# Patient Record
Sex: FEMALE | Race: BLACK OR AFRICAN AMERICAN | NOT HISPANIC OR LATINO
[De-identification: names, ages, dates, MRNs, and addresses within clinical notes are randomized per-mention and may not be internally consistent; named-entity substitution may affect disease eponyms.]

---

## 2019-03-29 PROBLEM — Z00.00 ENCOUNTER FOR PREVENTIVE HEALTH EXAMINATION: Status: ACTIVE | Noted: 2019-03-29

## 2019-05-13 ENCOUNTER — APPOINTMENT (OUTPATIENT)
Dept: ENDOCRINOLOGY | Facility: CLINIC | Age: 34
End: 2019-05-13
Payer: COMMERCIAL

## 2019-05-13 VITALS
HEIGHT: 64 IN | SYSTOLIC BLOOD PRESSURE: 100 MMHG | WEIGHT: 191 LBS | DIASTOLIC BLOOD PRESSURE: 67 MMHG | HEART RATE: 74 BPM | BODY MASS INDEX: 32.61 KG/M2

## 2019-05-13 DIAGNOSIS — E66.9 OBESITY, UNSPECIFIED: ICD-10-CM

## 2019-05-13 PROCEDURE — 99205 OFFICE O/P NEW HI 60 MIN: CPT

## 2019-05-15 PROBLEM — E66.9 OBESITY (BMI 30-39.9): Status: ACTIVE | Noted: 2019-05-15

## 2019-05-15 NOTE — ADDENDUM
[FreeTextEntry1] : I, Tristianmilton Sales, acted solely as a scribe for Dr. Matthias England on this date. 05/13/2019.\par

## 2019-05-15 NOTE — HISTORY OF PRESENT ILLNESS
[FreeTextEntry1] : 34 y/o F pt, /67, BMI 32.79, with elevated levels of testosterone and Hx of PCOS, referred by Dr. Sherry Diez, presents today to establish endocrine care with me.\par No Hx of HTN, Hyperlipidemia \par FHx: HTN (mother)\par No FHx of PCOS or CHH\par SHx: no tobacco use, social EtOH\par Regular menses and is not sexually active (pt had irregular menses prior to oral contraceptive use in 2018, later DC/ed in 3/2019 because it did not change pt's testosterone levels) \par \par 3/21/19 Total testosterone 128 (normal 8.4-48) Free Testosterone 18.83 (normal 0.3-5.6)\par \par Today pt presents, feeling well. She notes she had hormonal imbalance with symptoms of hair growth at age 26. Pt saw dermatologist initially and then OB/GYN, who recommended pt take spirolactone. She states spironolactone helped with her bloating, however did not notice much change in hair growth. Pt started birth control in 2018, DC/ed 3/2019 because it did not change pt's testosterone level. Pt notes shaving and waxing to get rid of the hair; she hair growth on her stomach and back, but not her inner thighs. Pt also c/o polyuria and nocturia which she relates to drinking a lot of water.\par Denies blurry vision, galactorrhea, and HA.\par \par Current Meds: Spironolactone 50mg

## 2019-05-15 NOTE — ASSESSMENT
[FreeTextEntry1] : 32 y/o F pt with:\par 1. Hyperandrogenism and Hirsutism:\par Differential diagnosis explained to pt at length, including risk for cardiometabolic disorders. \par Order work up.\par \par 2. Hx of obesity:\par Probable related to PCOS. \par General review on lifestyle and weight loss modification. \par Appointment to see RD.\par \par Return in 3 week. [Importance of Diet and Exercise] : importance of diet and exercise to improve glycemic control, achieve weight loss and improve cardiovascular health

## 2019-05-15 NOTE — PHYSICAL EXAM
[Alert] : alert [Normal Sclera/Conjunctiva] : normal sclera/conjunctiva [Normal Outer Ear/Nose] : the ears and nose were normal in appearance [No Respiratory Distress] : no respiratory distress [No Accessory Muscle Use] : no accessory muscle use [Normal Rate] : heart rate was normal  [Clear to Auscultation] : lungs were clear to auscultation bilaterally [No Edema] : there was no peripheral edema [Normal Bowel Sounds] : normal bowel sounds [No Stigmata of Cushings Syndrome] : no stigmata of cushings syndrome [Spine Straight] : spine straight [Normal Reflexes] : deep tendon reflexes were 2+ and symmetric [Normal Gait] : normal gait [Oriented x3] : oriented to person, place, and time [Pedal Pulses Normal] : the pedal pulses are present [Acanthosis Nigricans] : no acanthosis nigricans [de-identified] : R thyroid lobe palpated, no buffalo hump  [de-identified] : sinus normal

## 2019-05-15 NOTE — REVIEW OF SYSTEMS
[Polyuria] : polyuria [Nocturia] : nocturia [Negative] : Endocrine [As Noted in HPI] : as noted in HPI [Blurry Vision] : no blurred vision [Galactorrhea] : no galactorrhea  [Headache] : no headaches [FreeTextEntry2] : excessive hair growth

## 2019-05-20 LAB
ALBUMIN SERPL ELPH-MCNC: 4.3 G/DL
ALP BLD-CCNC: 49 U/L
ALT SERPL-CCNC: 13 U/L
ANDROST SERPL-MCNC: 658 NG/DL
ANION GAP SERPL CALC-SCNC: 12 MMOL/L
AST SERPL-CCNC: 19 U/L
BILIRUB SERPL-MCNC: 0.4 MG/DL
BUN SERPL-MCNC: 7 MG/DL
CALCIUM SERPL-MCNC: 9.2 MG/DL
CHLORIDE SERPL-SCNC: 104 MMOL/L
CHOLEST SERPL-MCNC: 145 MG/DL
CHOLEST/HDLC SERPL: 3 RATIO
CO2 SERPL-SCNC: 23 MMOL/L
CREAT SERPL-MCNC: 1 MG/DL
DHEA-S SERPL-MCNC: 586 UG/DL
ESTIMATED AVERAGE GLUCOSE: 103 MG/DL
FSH SERPL-MCNC: 4.2 IU/L
GLUCOSE SERPL-MCNC: 87 MG/DL
HBA1C MFR BLD HPLC: 5.2 %
HDLC SERPL-MCNC: 49 MG/DL
LDLC SERPL CALC-MCNC: 78 MG/DL
LH SERPL-ACNC: 10.5 IU/L
POTASSIUM SERPL-SCNC: 4.1 MMOL/L
PROLACTIN SERPL-MCNC: 13.4 NG/ML
PROT SERPL-MCNC: 7.1 G/DL
SODIUM SERPL-SCNC: 139 MMOL/L
TESTOST BND SERPL-MCNC: 4.7 PG/ML
TESTOST SERPL-MCNC: 72 NG/DL
TRIGL SERPL-MCNC: 91 MG/DL
TSH SERPL-ACNC: 1.15 UIU/ML

## 2019-06-07 ENCOUNTER — APPOINTMENT (OUTPATIENT)
Dept: ENDOCRINOLOGY | Facility: CLINIC | Age: 34
End: 2019-06-07
Payer: COMMERCIAL

## 2019-06-07 VITALS — WEIGHT: 197 LBS | BODY MASS INDEX: 33.82 KG/M2

## 2019-06-07 VITALS
WEIGHT: 197 LBS | DIASTOLIC BLOOD PRESSURE: 78 MMHG | HEART RATE: 81 BPM | BODY MASS INDEX: 33.82 KG/M2 | SYSTOLIC BLOOD PRESSURE: 122 MMHG

## 2019-06-07 PROCEDURE — 97802 MEDICAL NUTRITION INDIV IN: CPT

## 2019-06-07 PROCEDURE — 99214 OFFICE O/P EST MOD 30 MIN: CPT

## 2019-06-07 NOTE — HISTORY OF PRESENT ILLNESS
[Ibandronate (Boniva)] : Ibandronate [FreeTextEntry1] : 3/21/19 Total testosterone 128 (normal 8.4-48) Free Testosterone 18.83 (normal 0.3-5.6)\par 5/13/19 A1c 5.2%, LDL-c 78, s. creat 1.00, DHEAS 586, LH 10.5, Prolactin 13.4, FSH 4.2, TSH 1.15, Free Testosterone 4.7, Total Testosterone 72.0, Androstenedione 658 \par \par 34 y/o F pt, BMI 33.82, with elevated levels of testosterone and Hx of PCOS.\par Other PMHx: obesity\par No Hx of HTN, Hyperlipidemia \par FHx: HTN (mother)\par No FHx of PCOS or CHH\par SHx: no tobacco use, social EtOH\par LNMP: 6/7/19; pt has regular menses and is not sexually active (pt had irregular menses prior to oral contraceptive use in 2018, later DC/ed in 3/2019 because it did not change pt's testosterone levels) \par \par 5/13/19 visit:\par Today pt presents, feeling well. She notes she had hormonal imbalance with symptoms of hair growth at age 26. Pt saw dermatologist initially and then OB/GYN, who recommended pt take spirolactone. She states spironolactone helped with her bloating, however did not notice much change in hair growth. Pt started birth control in 2018, DC/ed 3/2019 because it did not change pt's testosterone level. Pt notes shaving and waxing to get rid of the hair; she hair growth on her stomach and back, but not her inner thighs. Pt also c/o polyuria and nocturia which she relates to drinking a lot of water.\par Denies blurry vision, galactorrhea, and HA.\par \par 6/7/19 visit:\par Today pt presents for endocrine f/u. She has been experiencing symptoms of hirsutism and acne for the past 5 yrs; pt notes fluctuating weight change over the past years. She states has been on and off spironolactone for 5 yrs, and is considering changing her dermatologist. \par Denies abd pain, and galactorrhea. \par \par Current Meds: Spironolactone 50mg

## 2019-06-07 NOTE — PHYSICAL EXAM
[Alert] : alert [Normal Sclera/Conjunctiva] : normal sclera/conjunctiva [Normal Outer Ear/Nose] : the ears and nose were normal in appearance [No Respiratory Distress] : no respiratory distress [No Accessory Muscle Use] : no accessory muscle use [Clear to Auscultation] : lungs were clear to auscultation bilaterally [Normal Rate] : heart rate was normal  [Pedal Pulses Normal] : the pedal pulses are present [No Edema] : there was no peripheral edema [Normal Bowel Sounds] : normal bowel sounds [Spine Straight] : spine straight [No Stigmata of Cushings Syndrome] : no stigmata of cushings syndrome [Normal Gait] : normal gait [Normal Reflexes] : deep tendon reflexes were 2+ and symmetric [Oriented x3] : oriented to person, place, and time [Acanthosis Nigricans] : no acanthosis nigricans [de-identified] : sinus normal  [de-identified] : R thyroid lobe palpated, no buffalo hump

## 2019-06-07 NOTE — END OF VISIT
[>50% of Time Spent on Counseling for ____] : Greater than 50% of the encounter time was spent on counseling for [unfilled] [FreeTextEntry3] : All medical record entries made by the Scribe were at my, Dr. Matthias England, direction and personally dictated by me on 06/07/2019. I have reviewed the chart and agree that the record accurately reflects my personal performance of the history, physical exam, assessment and plan. I have also personally directed, reviewed and agreed with the chart.\par  [Time Spent: ___ minutes] : I have spent [unfilled] minutes of face to face time with the patient

## 2019-06-07 NOTE — ASSESSMENT
[Importance of Diet and Exercise] : importance of diet and exercise to improve glycemic control, achieve weight loss and improve cardiovascular health [FreeTextEntry1] : 34 y/o F pt with:\par 1. Hx of Hirsutism, acne, and irregular messes (~5 yrs ago):\par Pt has been seen by several physicians and was informed of a PCOS diagnosis. Pt was on different treatments such as oral contraceptives and spirolactone. \par Pt DC/ed spirolactone and oral contraceptive in 3/2019 because she felt it was ineffective. Pt sees a dermatologist regularly. She does not have any immediate plans for pregnancies. \par Last test done on 5/13/19 show increased androgens and DHEAS (high in the 500s). \par Will repeat androgen and DHEAS labs.\par \par Return in 2 months.

## 2019-06-07 NOTE — REVIEW OF SYSTEMS
[Negative] : Endocrine [As Noted in HPI] : as noted in HPI [Hirsutism] : hirsutism [Acne] : acne [Abdominal Pain] : no abdominal pain [Galactorrhea] : no galactorrhea  [FreeTextEntry2] : excessive hair growth

## 2019-06-07 NOTE — ADDENDUM
[FreeTextEntry1] : I, Tristianmilton Sales, acted solely as a scribe for Dr. Matthias England on this date. 06/07/2019.\par

## 2019-07-15 ENCOUNTER — APPOINTMENT (OUTPATIENT)
Dept: ENDOCRINOLOGY | Facility: CLINIC | Age: 34
End: 2019-07-15

## 2019-08-02 ENCOUNTER — OTHER (OUTPATIENT)
Age: 34
End: 2019-08-02

## 2019-08-02 LAB
17OHP SERPL-MCNC: 45 NG/DL
ANDROST SERPL-MCNC: 709 NG/DL
DHEA-SULFATE, SERUM: 760 UG/DL
ESTRADIOL SERPL-MCNC: 37 PG/ML
LH SERPL-ACNC: 5.6 IU/L
PROLACTIN SERPL-MCNC: 14.4 NG/ML
TESTOST BND SERPL-MCNC: 8.9 PG/ML
TESTOST SERPL-MCNC: 120.3 NG/DL

## 2019-08-06 ENCOUNTER — FORM ENCOUNTER (OUTPATIENT)
Age: 34
End: 2019-08-06

## 2019-08-07 ENCOUNTER — APPOINTMENT (OUTPATIENT)
Age: 34
End: 2019-08-07
Payer: COMMERCIAL

## 2019-08-07 ENCOUNTER — OUTPATIENT (OUTPATIENT)
Dept: OUTPATIENT SERVICES | Facility: HOSPITAL | Age: 34
LOS: 1 days | End: 2019-08-07

## 2019-08-07 PROCEDURE — 74178 CT ABD&PLV WO CNTR FLWD CNTR: CPT | Mod: 26

## 2019-08-21 ENCOUNTER — APPOINTMENT (OUTPATIENT)
Dept: ENDOCRINOLOGY | Facility: CLINIC | Age: 34
End: 2019-08-21
Payer: COMMERCIAL

## 2019-08-21 VITALS
BODY MASS INDEX: 33.13 KG/M2 | HEART RATE: 87 BPM | DIASTOLIC BLOOD PRESSURE: 81 MMHG | WEIGHT: 193 LBS | SYSTOLIC BLOOD PRESSURE: 124 MMHG

## 2019-08-21 PROCEDURE — 99215 OFFICE O/P EST HI 40 MIN: CPT

## 2019-08-27 NOTE — REVIEW OF SYSTEMS
[As Noted in HPI] : as noted in HPI [Hirsutism] : hirsutism [Negative] : Endocrine [Abdominal Pain] : no abdominal pain [FreeTextEntry2] : excessive hair growth, no change from last vivit

## 2019-08-27 NOTE — ASSESSMENT
[Importance of Diet and Exercise] : importance of diet and exercise to improve glycemic control, achieve weight loss and improve cardiovascular health [FreeTextEntry1] : 34 y/o F pt with:\par \par 1) Hx of hyperandrogenism with Hx of hirsutism,\par levels of testosterone increased two-fold and levels of DHEA- Sulfate increase three-fold\par Adrenal-protocol CAT scan from 8/7/19 demonstrates 4.8 cm enhancing lesion seen in the left adrenal gland with necrotic center. Given history of hyperandrogenism, size and heterogeneity adrenal cortical carcinoma must be excluded. \par Send in additional adrenal hormonal studies that include aldosterone, renin, serum metanephrine, cortisol, and urine 24 hours for catecholamines and cortisol.\par In addition to biochemical studies, we are sending pet scan with PEG,\par Pt is being referred to adrenal surgeon (pt is leaving town and will arrange an appointment as soon as she is back)\par Pt has increased levels of androgens and large adrenal mass categorized as heterogenous with necrosis that highly represents adrenal cortical carcinoma.

## 2019-08-27 NOTE — PHYSICAL EXAM
[Alert] : alert [Normal Sclera/Conjunctiva] : normal sclera/conjunctiva [Normal Outer Ear/Nose] : the ears and nose were normal in appearance [No Respiratory Distress] : no respiratory distress [Clear to Auscultation] : lungs were clear to auscultation bilaterally [No Accessory Muscle Use] : no accessory muscle use [Normal Rate] : heart rate was normal  [Pedal Pulses Normal] : the pedal pulses are present [No Edema] : there was no peripheral edema [Normal Bowel Sounds] : normal bowel sounds [Spine Straight] : spine straight [Normal Gait] : normal gait [No Stigmata of Cushings Syndrome] : no stigmata of cushings syndrome [Normal Reflexes] : deep tendon reflexes were 2+ and symmetric [Oriented x3] : oriented to person, place, and time [Acanthosis Nigricans] : no acanthosis nigricans [de-identified] : R thyroid lobe palpated, no buffalo hump  [de-identified] : sinus normal

## 2019-08-27 NOTE — END OF VISIT
[FreeTextEntry3] : All medical record entries made by the Scribe were at my, Dr. Matthias England, direction and personally dictated by me on 08/21/2019 I have reviewed the chart and agree that the record accurately reflects my personal performance of the history, physical exam, assessment and plan. I have also personally directed, reviewed and agreed with the chart.  [>50% of Time Spent on Counseling for ____] : Greater than 50% of the encounter time was spent on counseling for [unfilled] [Time Spent: ___ minutes] : I have spent [unfilled] minutes of face to face time with the patient

## 2019-08-27 NOTE — ADDENDUM
[FreeTextEntry1] : I, Froylan Carter, acted solely as a scribe for Dr. Matthias England on this date. 08/21/2019.

## 2019-08-27 NOTE — HISTORY OF PRESENT ILLNESS
[Ibandronate (Boniva)] : Ibandronate [FreeTextEntry1] : 3/21/19 Total testosterone 128 (normal 8.4-48) Free Testosterone 18.83 (normal 0.3-5.6)\par 5/13/19 A1c 5.2%, LDL-c 78, s. creat 1.00, DHEAS 586, LH 10.5, Prolactin 13.4, FSH 4.2, TSH 1.15, Free Testosterone 4.7, Total Testosterone 72.0, Androstenedione 658 \par 7/15/19: DHEA- Sulfate 760, androstenedione 709, testosterone free 8.9, testosterone total 120.3, estradiol 37, Prolactin 14.4\par 8/7/19 Abdomen and Pelvis CT impression: 4.8 cm enhancing lesion seen in the left adrenal gland with necrotic center. Given history of hyperandrogenism, size and heterogeneity adrenal cortical carcinoma must be excluded. Surgical consultation is recommended. No vascular invasion or metastatic disease\par \par 32 y/o F pt, BMI , with elevated levels of testosterone and Hx of PCOS.\par Other PM Hx: obesity\par No Hx of HTN, Hyperlipidemia \par FHx: HTN (mother)\par No FHx of PCOS or CHH\par SHx: no tobacco use, social EtOH\par LNMP: 6/7/19; pt has regular menses and is not sexually active (pt had irregular menses prior to oral contraceptive use in 2018, later DC/ed in 3/2019 because it did not change pt's testosterone levels) \par \par 5/13/19 visit:\par Today pt presents, feeling well. She notes she had hormonal imbalance with symptoms of hair growth at age 26. Pt saw dermatologist initially and then OB/GYN, who recommended pt take spirolactone. She states spironolactone helped with her bloating, however did not notice much change in hair growth. Pt started birth control in 2018, DC/ed 3/2019 because it did not change pt's testosterone level. Pt notes shaving and waxing to get rid of the hair; she hair growth on her stomach and back, but not her inner thighs. Pt also c/o polyuria and nocturia which she relates to drinking a lot of water.\par Denies blurry vision, galactorrhea, and HA.\par \par 6/7/19 visit:\par Today pt presents for endocrine f/u. She has been experiencing symptoms of hirsutism and acne for the past 5 yrs; pt notes fluctuating weight change over the past years. She states has been on and off spironolactone for 5 yrs, and is considering changing her dermatologist. \par Denies abd pain, and galactorrhea. \par \par 08/21/2019 \par Pt with a Hx of Hirsutism, and increased levels of testosterone in 5/2019,  including DHEA- Sulfate.\par Lab was repeated in July and confirms the above labs results.\par Pt presents today with her sister for a testosterone f/u. Patient is feeling fine. Clinically unchanged\par Current Meds: None

## 2019-08-29 ENCOUNTER — OTHER (OUTPATIENT)
Age: 34
End: 2019-08-29

## 2019-09-09 ENCOUNTER — APPOINTMENT (OUTPATIENT)
Dept: SURGICAL ONCOLOGY | Facility: CLINIC | Age: 34
End: 2019-09-09
Payer: COMMERCIAL

## 2019-09-09 VITALS
WEIGHT: 205 LBS | SYSTOLIC BLOOD PRESSURE: 113 MMHG | TEMPERATURE: 98.5 F | HEIGHT: 64 IN | HEART RATE: 70 BPM | BODY MASS INDEX: 35 KG/M2 | DIASTOLIC BLOOD PRESSURE: 70 MMHG

## 2019-09-09 VITALS
HEART RATE: 70 BPM | DIASTOLIC BLOOD PRESSURE: 70 MMHG | SYSTOLIC BLOOD PRESSURE: 117 MMHG | WEIGHT: 205 LBS | BODY MASS INDEX: 35 KG/M2 | HEIGHT: 64 IN | TEMPERATURE: 98.5 F

## 2019-09-09 DIAGNOSIS — E28.2 POLYCYSTIC OVARIAN SYNDROME: ICD-10-CM

## 2019-09-09 DIAGNOSIS — Z82.49 FAMILY HISTORY OF ISCHEMIC HEART DISEASE AND OTHER DISEASES OF THE CIRCULATORY SYSTEM: ICD-10-CM

## 2019-09-09 PROCEDURE — 99204 OFFICE O/P NEW MOD 45 MIN: CPT

## 2019-09-09 NOTE — HISTORY OF PRESENT ILLNESS
[de-identified] : Patient Name: MICHELLE SOSA   May 28 1985 \par [employer] MRN: [id number]  \par Newborn MRN: \par Referring Provider:THANG ENGLAND MD \par Oncologist: \par \par Date of Visit: 2019 \par \par Diagnosis: Adrenal mass\par \par \par 33 yo w adrenal mass\par \par Was told of PCOS (polycystic ovarian syndrome) 6 years ago. Has hirsutism, weight gain but regular menses. Tried spironolactone without significant benefit. Also tried BC pills. Finally saw Dr England from endocrine. Hormonal workup and imaging done found a 5 cm mass left adrenal. Patient now referred for evaluation. Denies palpitations or evidence of pheo. No abdominal pain. Rest of endocrine workup according to patient negative.\par \par

## 2019-09-09 NOTE — RESULTS/DATA
[FreeTextEntry1] : Labs: as per endocrine notes\par \par Imaging: CT Aug 2019 : 4.8 cm left adrenal lesion w necrosis

## 2019-09-09 NOTE — PHYSICAL EXAM
[Normal] : supple, no neck mass and thyroid not enlarged [Normal Supraclavicular Lymph Nodes] : normal supraclavicular lymph nodes [Normal Neck Lymph Nodes] : normal neck lymph nodes  [Normal Groin Lymph Nodes] : normal groin lymph nodes [Normal Axillary Lymph Nodes] : normal axillary lymph nodes [Normal] : oriented to person, place and time, with appropriate affect [de-identified] : obese [de-identified] : hirsutism

## 2019-09-09 NOTE — ASSESSMENT
[FreeTextEntry1] : i) left adrenal mass\par \par p) Long discussion w patient about findings. Has hyperandrogenemia and adrenal mass. Must rule out neoplasm. Recommend minimally invasive adrenalectomy. Risks and benefits discussed, including but not limited to post bleeding, infection, organ injury. All questions answered.Will schedule in October pending rest of endocrine w/u result. \par \par Dmitriy Israel MD\par \par Chief Surgical Oncology\par Multidisciplinary GI cancer program\par [facility] [place]\par [facility]\par \par Professor Surgery\par [employer]\par \par \par \par \par

## 2019-09-18 ENCOUNTER — FORM ENCOUNTER (OUTPATIENT)
Age: 34
End: 2019-09-18

## 2019-09-19 ENCOUNTER — OUTPATIENT (OUTPATIENT)
Dept: OUTPATIENT SERVICES | Facility: HOSPITAL | Age: 34
LOS: 1 days | End: 2019-09-19
Payer: COMMERCIAL

## 2019-09-19 DIAGNOSIS — E27.8 OTHER SPECIFIED DISORDERS OF ADRENAL GLAND: ICD-10-CM

## 2019-09-19 LAB — GLUCOSE BLDC GLUCOMTR-MCNC: 82 MG/DL — SIGNIFICANT CHANGE UP (ref 70–99)

## 2019-09-19 PROCEDURE — 82962 GLUCOSE BLOOD TEST: CPT

## 2019-09-19 PROCEDURE — 78815 PET IMAGE W/CT SKULL-THIGH: CPT

## 2019-09-19 PROCEDURE — A9552: CPT

## 2019-09-19 PROCEDURE — 78815 PET IMAGE W/CT SKULL-THIGH: CPT | Mod: 26

## 2019-09-20 ENCOUNTER — OTHER (OUTPATIENT)
Age: 34
End: 2019-09-20

## 2019-09-20 ENCOUNTER — APPOINTMENT (OUTPATIENT)
Dept: ENDOCRINOLOGY | Facility: CLINIC | Age: 34
End: 2019-09-20

## 2019-09-30 LAB
ALDOSTERONE SERUM: 9.2 NG/DL
CORTIS 24H UR-MCNC: 24 H
CORTIS 24H UR-MRATE: 24 MCG/24 H
CREAT 24H UR-MCNC: 2.2 G/24 H
CREAT ?TM UR-MCNC: 90 MG/DL
DOPAMINE UR-MCNC: 200 UG/L
DOPAMINE, UR, 24HR: 485 UG/24 HR
EPINEPH UR-MCNC: 3 UG/L
EPINEPHRINE, U, 24HR: 7 UG/24 HR
METAINT: 24 H
METANEPH 24H UR-MRATE: 449 MCG/24 H
METANEPH UR-MCNC: 2425 ML
METANEPHS UR-MCNC: 1329 MCG/24 H
NOREPINEPH UR-MCNC: 22 UG/L
NOREPINEPHRINE,U,24H: 53 UG/24 HR
NORMETANEPHRINE 24H UR-MCNC: 880 MCG/24 H
PROT ?TM UR-MCNC: 24 HR
SPECIMEN VOL 24H UR: 2425 ML
SPECIMEN VOL 24H UR: 2425 ML
TSH SERPL-ACNC: 1.61 UIU/ML

## 2019-10-01 ENCOUNTER — OTHER (OUTPATIENT)
Age: 34
End: 2019-10-01

## 2019-10-11 ENCOUNTER — MOBILE ON CALL (OUTPATIENT)
Age: 34
End: 2019-10-11

## 2019-10-14 ENCOUNTER — OTHER (OUTPATIENT)
Age: 34
End: 2019-10-14

## 2019-10-24 ENCOUNTER — OTHER (OUTPATIENT)
Age: 34
End: 2019-10-24

## 2019-10-29 LAB
ABO + RH PNL BLD: NORMAL
ABO + RH PNL BLD: NORMAL
APTT BLD: 27.2 SEC
BASOPHILS # BLD AUTO: 0.05 K/UL
BASOPHILS NFR BLD AUTO: 0.5 %
EOSINOPHIL # BLD AUTO: 0.14 K/UL
EOSINOPHIL NFR BLD AUTO: 1.4 %
HCG SERPL-MCNC: <1 MIU/ML
HCT VFR BLD CALC: 42.2 %
HGB BLD-MCNC: 12.6 G/DL
IMM GRANULOCYTES NFR BLD AUTO: 0.5 %
INR PPP: 1.1 RATIO
LYMPHOCYTES # BLD AUTO: 3.01 K/UL
LYMPHOCYTES NFR BLD AUTO: 30.7 %
MAN DIFF?: NORMAL
MCHC RBC-ENTMCNC: 27.5 PG
MCHC RBC-ENTMCNC: 29.9 GM/DL
MCV RBC AUTO: 92.1 FL
MONOCYTES # BLD AUTO: 0.98 K/UL
MONOCYTES NFR BLD AUTO: 10 %
NEUTROPHILS # BLD AUTO: 5.58 K/UL
NEUTROPHILS NFR BLD AUTO: 56.9 %
PLATELET # BLD AUTO: 281 K/UL
PT BLD: 12.6 SEC
RBC # BLD: 4.58 M/UL
RBC # FLD: 13 %
WBC # FLD AUTO: 9.81 K/UL

## 2019-10-30 ENCOUNTER — RESULT REVIEW (OUTPATIENT)
Age: 34
End: 2019-10-30

## 2019-10-30 ENCOUNTER — INPATIENT (INPATIENT)
Facility: HOSPITAL | Age: 34
LOS: 2 days | Discharge: HOME | DRG: 615 | End: 2019-11-02
Attending: SURGERY | Admitting: SURGERY
Payer: COMMERCIAL

## 2019-10-30 ENCOUNTER — APPOINTMENT (OUTPATIENT)
Dept: SURGICAL ONCOLOGY | Facility: HOSPITAL | Age: 34
End: 2019-10-30

## 2019-10-30 VITALS
WEIGHT: 195.99 LBS | SYSTOLIC BLOOD PRESSURE: 118 MMHG | TEMPERATURE: 98 F | RESPIRATION RATE: 16 BRPM | DIASTOLIC BLOOD PRESSURE: 75 MMHG | HEIGHT: 63 IN | HEART RATE: 75 BPM | OXYGEN SATURATION: 98 %

## 2019-10-30 LAB
ANION GAP SERPL CALC-SCNC: 12 MMOL/L — SIGNIFICANT CHANGE UP (ref 5–17)
BUN SERPL-MCNC: 14 MG/DL — SIGNIFICANT CHANGE UP (ref 7–23)
CALCIUM SERPL-MCNC: 9 MG/DL — SIGNIFICANT CHANGE UP (ref 8.4–10.5)
CHLORIDE SERPL-SCNC: 103 MMOL/L — SIGNIFICANT CHANGE UP (ref 96–108)
CO2 SERPL-SCNC: 22 MMOL/L — SIGNIFICANT CHANGE UP (ref 22–31)
CREAT SERPL-MCNC: 0.96 MG/DL — SIGNIFICANT CHANGE UP (ref 0.5–1.3)
GLUCOSE SERPL-MCNC: 109 MG/DL — HIGH (ref 70–99)
HCT VFR BLD CALC: 37.7 % — SIGNIFICANT CHANGE UP (ref 34.5–45)
HGB BLD-MCNC: 11.9 G/DL — SIGNIFICANT CHANGE UP (ref 11.5–15.5)
MAGNESIUM SERPL-MCNC: 1.9 MG/DL — SIGNIFICANT CHANGE UP (ref 1.6–2.6)
MCHC RBC-ENTMCNC: 28.3 PG — SIGNIFICANT CHANGE UP (ref 27–34)
MCHC RBC-ENTMCNC: 31.6 GM/DL — LOW (ref 32–36)
MCV RBC AUTO: 89.5 FL — SIGNIFICANT CHANGE UP (ref 80–100)
NRBC # BLD: 0 /100 WBCS — SIGNIFICANT CHANGE UP (ref 0–0)
PHOSPHATE SERPL-MCNC: 3.6 MG/DL — SIGNIFICANT CHANGE UP (ref 2.5–4.5)
PLATELET # BLD AUTO: 233 K/UL — SIGNIFICANT CHANGE UP (ref 150–400)
POTASSIUM SERPL-MCNC: 4.5 MMOL/L — SIGNIFICANT CHANGE UP (ref 3.5–5.3)
POTASSIUM SERPL-SCNC: 4.5 MMOL/L — SIGNIFICANT CHANGE UP (ref 3.5–5.3)
RBC # BLD: 4.21 M/UL — SIGNIFICANT CHANGE UP (ref 3.8–5.2)
RBC # FLD: 12.9 % — SIGNIFICANT CHANGE UP (ref 10.3–14.5)
SODIUM SERPL-SCNC: 137 MMOL/L — SIGNIFICANT CHANGE UP (ref 135–145)
WBC # BLD: 9.97 K/UL — SIGNIFICANT CHANGE UP (ref 3.8–10.5)
WBC # FLD AUTO: 9.97 K/UL — SIGNIFICANT CHANGE UP (ref 3.8–10.5)

## 2019-10-30 PROCEDURE — 60650 LAPAROSCOPY ADRENALECTOMY: CPT

## 2019-10-30 PROCEDURE — S2900 ROBOTIC SURGICAL SYSTEM: CPT | Mod: NC

## 2019-10-30 RX ORDER — SCOPALAMINE 1 MG/3D
1 PATCH, EXTENDED RELEASE TRANSDERMAL ONCE
Refills: 0 | Status: COMPLETED | OUTPATIENT
Start: 2019-10-30 | End: 2019-10-30

## 2019-10-30 RX ORDER — OXYCODONE AND ACETAMINOPHEN 5; 325 MG/1; MG/1
1 TABLET ORAL EVERY 4 HOURS
Refills: 0 | Status: DISCONTINUED | OUTPATIENT
Start: 2019-10-30 | End: 2019-11-02

## 2019-10-30 RX ORDER — HYDROMORPHONE HYDROCHLORIDE 2 MG/ML
0.5 INJECTION INTRAMUSCULAR; INTRAVENOUS; SUBCUTANEOUS
Refills: 0 | Status: DISCONTINUED | OUTPATIENT
Start: 2019-10-30 | End: 2019-10-31

## 2019-10-30 RX ORDER — MAGNESIUM SULFATE 500 MG/ML
1 VIAL (ML) INJECTION ONCE
Refills: 0 | Status: COMPLETED | OUTPATIENT
Start: 2019-10-30 | End: 2019-10-31

## 2019-10-30 RX ORDER — ONDANSETRON 8 MG/1
4 TABLET, FILM COATED ORAL EVERY 6 HOURS
Refills: 0 | Status: DISCONTINUED | OUTPATIENT
Start: 2019-10-30 | End: 2019-11-02

## 2019-10-30 RX ORDER — OXYCODONE AND ACETAMINOPHEN 5; 325 MG/1; MG/1
2 TABLET ORAL EVERY 4 HOURS
Refills: 0 | Status: DISCONTINUED | OUTPATIENT
Start: 2019-10-30 | End: 2019-11-02

## 2019-10-30 RX ORDER — SODIUM CHLORIDE 9 MG/ML
1000 INJECTION, SOLUTION INTRAVENOUS
Refills: 0 | Status: DISCONTINUED | OUTPATIENT
Start: 2019-10-30 | End: 2019-11-01

## 2019-10-30 RX ORDER — BUPIVACAINE 13.3 MG/ML
20 INJECTION, SUSPENSION, LIPOSOMAL INFILTRATION ONCE
Refills: 0 | Status: DISCONTINUED | OUTPATIENT
Start: 2019-10-30 | End: 2019-10-30

## 2019-10-30 RX ADMIN — SCOPALAMINE 1 PATCH: 1 PATCH, EXTENDED RELEASE TRANSDERMAL at 19:40

## 2019-10-30 RX ADMIN — HYDROMORPHONE HYDROCHLORIDE 0.5 MILLIGRAM(S): 2 INJECTION INTRAMUSCULAR; INTRAVENOUS; SUBCUTANEOUS at 20:33

## 2019-10-30 RX ADMIN — SCOPALAMINE 1 PATCH: 1 PATCH, EXTENDED RELEASE TRANSDERMAL at 13:45

## 2019-10-30 RX ADMIN — HYDROMORPHONE HYDROCHLORIDE 0.5 MILLIGRAM(S): 2 INJECTION INTRAMUSCULAR; INTRAVENOUS; SUBCUTANEOUS at 19:47

## 2019-10-30 NOTE — H&P PST ADULT - ASSESSMENT
34F with a hx of PCOS, obesity, hirsutism 2/2 a left adrenal mass here for an elective robotic assisted laparoscopic left adrenalectomy.    - Will admit to the surgical service post op, team 1, Dr. Israel, regional bed.  - CLD  - Pain/nausea control  - SCDs/OOBA  - IVF until tolerating po  - post op labs and am labs

## 2019-10-30 NOTE — PROGRESS NOTE ADULT - SUBJECTIVE AND OBJECTIVE BOX
POST-OPERATIVE NOTE    Procedure: L adrenalectomy    Diagnosis/Indication: adrenal mass    Surgeon: Dr. Israel    S: Pt has no complaints. Denies CP, SOB,  calf tenderness. Pain controlled with medication. Denies nausea, vomiting.    O:  T(C): --  T(F): --  HR: 86 (10-30-19 @ 20:56) (74 - 86)  BP: 106/64 (10-30-19 @ 20:56) (104/57 - 113/63)  RR: 16 (10-30-19 @ 20:56) (15 - 19)  SpO2: 99% (10-30-19 @ 20:56) (99% - 100%)  Wt(kg): --                        11.9   9.97  )-----------( 233      ( 30 Oct 2019 19:34 )             37.7     10-30    137  |  103  |  14  ----------------------------<  109<H>  4.5   |  22  |  0.96    Ca    9.0      30 Oct 2019 19:34  Phos  3.6     10-30  Mg     1.9     10-30        Gen: NAD, resting comfortably in bed  C/V: NSR  Pulm: Nonlabored breathing, no respiratory distress  Abd: soft, ATTP, nondistended, incisions CDI  Extrem: no calf tenderness, SCDs in place

## 2019-10-30 NOTE — BRIEF OPERATIVE NOTE - OPERATION/FINDINGS
Robotic assisted laparoscopic left adrenalectomy. Large encapsulated spherical mass, measuring roughly [id number] abutting left renal artery/vein and splenic vein. Partial mobilization of spleen pancreas and descending colon. Left adrenal vein clipped and divided. Smaller contributing vessels ligated with robotic vessel sealer. Mass removed in its entirety. Mass removed with endocatch bag via small pfannensteil incision. Hemostasis achieved at the end of the case and left kidney pink and perfused. Fascia closed primarily.

## 2019-10-30 NOTE — BRIEF OPERATIVE NOTE - NSICDXBRIEFPREOP_GEN_ALL_CORE_FT
PRE-OP DIAGNOSIS:  Hirsutism 30-Oct-2019 18:52:20  Ayaz Montenegro  Adrenal mass 30-Oct-2019 18:52:09  Ayaz Montenegro

## 2019-10-30 NOTE — BRIEF OPERATIVE NOTE - NSICDXBRIEFPOSTOP_GEN_ALL_CORE_FT
POST-OP DIAGNOSIS:  Hirsutism 30-Oct-2019 18:52:41  Ayaz Montenegro  Adrenal mass 30-Oct-2019 18:52:31  Ayaz Montenegro

## 2019-10-30 NOTE — PROGRESS NOTE ADULT - ASSESSMENT
34F hx of PCOS, obesity and hirsutism. Has tried medical therapy for her hirsutism without significant improvement. Outpatient work up found elevated testosterone levels. Urine metanephrines were WNL. Follow up imaging showing 6cm left adrenal mass now s/p elective robotic assisted laparoscopic left adrenalectomy    CLD  Pain/nausea control  OOBA/IS/SCDs  AM labs

## 2019-10-30 NOTE — H&P PST ADULT - HISTORY OF PRESENT ILLNESS
34F hx of PCOS, obesity and hirsutism. Has tried medical therapy for her hirsutism without significant improvement. Outpatient work up found elevated testosterone levels. Urine metanephrines were WNL. Follow up imaging showing 6cm left adrenal mass. She now presents for an elective robotic assisted laparoscopic left adrenalectomy. Denies palpitations or evidence of pheo, abdominal/flank or back pain. Denies any recent fevers, chills, dysuria.     PMH: PCOS, obesity, hirsutism  Meds: None  NKDA  PSH: None  Never smoker, minimal ETOH  FH: Denies any oncologic family hx.

## 2019-10-31 LAB
ANION GAP SERPL CALC-SCNC: 10 MMOL/L — SIGNIFICANT CHANGE UP (ref 5–17)
BUN SERPL-MCNC: 11 MG/DL — SIGNIFICANT CHANGE UP (ref 7–23)
CALCIUM SERPL-MCNC: 9 MG/DL — SIGNIFICANT CHANGE UP (ref 8.4–10.5)
CHLORIDE SERPL-SCNC: 104 MMOL/L — SIGNIFICANT CHANGE UP (ref 96–108)
CO2 SERPL-SCNC: 24 MMOL/L — SIGNIFICANT CHANGE UP (ref 22–31)
CREAT SERPL-MCNC: 1.06 MG/DL — SIGNIFICANT CHANGE UP (ref 0.5–1.3)
GLUCOSE SERPL-MCNC: 98 MG/DL — SIGNIFICANT CHANGE UP (ref 70–99)
HCT VFR BLD CALC: 37.5 % — SIGNIFICANT CHANGE UP (ref 34.5–45)
HGB BLD-MCNC: 11.7 G/DL — SIGNIFICANT CHANGE UP (ref 11.5–15.5)
MAGNESIUM SERPL-MCNC: 1.8 MG/DL — SIGNIFICANT CHANGE UP (ref 1.6–2.6)
MCHC RBC-ENTMCNC: 27.9 PG — SIGNIFICANT CHANGE UP (ref 27–34)
MCHC RBC-ENTMCNC: 31.2 GM/DL — LOW (ref 32–36)
MCV RBC AUTO: 89.3 FL — SIGNIFICANT CHANGE UP (ref 80–100)
NRBC # BLD: 0 /100 WBCS — SIGNIFICANT CHANGE UP (ref 0–0)
PHOSPHATE SERPL-MCNC: 3.1 MG/DL — SIGNIFICANT CHANGE UP (ref 2.5–4.5)
PLATELET # BLD AUTO: 231 K/UL — SIGNIFICANT CHANGE UP (ref 150–400)
POTASSIUM SERPL-MCNC: 4.2 MMOL/L — SIGNIFICANT CHANGE UP (ref 3.5–5.3)
POTASSIUM SERPL-SCNC: 4.2 MMOL/L — SIGNIFICANT CHANGE UP (ref 3.5–5.3)
RBC # BLD: 4.2 M/UL — SIGNIFICANT CHANGE UP (ref 3.8–5.2)
RBC # FLD: 13 % — SIGNIFICANT CHANGE UP (ref 10.3–14.5)
SODIUM SERPL-SCNC: 138 MMOL/L — SIGNIFICANT CHANGE UP (ref 135–145)
WBC # BLD: 10.19 K/UL — SIGNIFICANT CHANGE UP (ref 3.8–10.5)
WBC # FLD AUTO: 10.19 K/UL — SIGNIFICANT CHANGE UP (ref 3.8–10.5)

## 2019-10-31 RX ORDER — MAGNESIUM SULFATE 500 MG/ML
1 VIAL (ML) INJECTION ONCE
Refills: 0 | Status: COMPLETED | OUTPATIENT
Start: 2019-10-31 | End: 2019-10-31

## 2019-10-31 RX ORDER — HEPARIN SODIUM 5000 [USP'U]/ML
5000 INJECTION INTRAVENOUS; SUBCUTANEOUS EVERY 8 HOURS
Refills: 0 | Status: DISCONTINUED | OUTPATIENT
Start: 2019-10-31 | End: 2019-11-02

## 2019-10-31 RX ADMIN — Medication 100 GRAM(S): at 09:39

## 2019-10-31 RX ADMIN — HEPARIN SODIUM 5000 UNIT(S): 5000 INJECTION INTRAVENOUS; SUBCUTANEOUS at 14:13

## 2019-10-31 RX ADMIN — OXYCODONE AND ACETAMINOPHEN 1 TABLET(S): 5; 325 TABLET ORAL at 11:41

## 2019-10-31 RX ADMIN — OXYCODONE AND ACETAMINOPHEN 1 TABLET(S): 5; 325 TABLET ORAL at 15:51

## 2019-10-31 RX ADMIN — OXYCODONE AND ACETAMINOPHEN 2 TABLET(S): 5; 325 TABLET ORAL at 20:02

## 2019-10-31 RX ADMIN — SCOPALAMINE 1 PATCH: 1 PATCH, EXTENDED RELEASE TRANSDERMAL at 06:29

## 2019-10-31 RX ADMIN — HEPARIN SODIUM 5000 UNIT(S): 5000 INJECTION INTRAVENOUS; SUBCUTANEOUS at 21:39

## 2019-10-31 RX ADMIN — SCOPALAMINE 1 PATCH: 1 PATCH, EXTENDED RELEASE TRANSDERMAL at 20:01

## 2019-10-31 RX ADMIN — OXYCODONE AND ACETAMINOPHEN 1 TABLET(S): 5; 325 TABLET ORAL at 06:59

## 2019-10-31 RX ADMIN — OXYCODONE AND ACETAMINOPHEN 1 TABLET(S): 5; 325 TABLET ORAL at 11:40

## 2019-10-31 RX ADMIN — SODIUM CHLORIDE 50 MILLILITER(S): 9 INJECTION, SOLUTION INTRAVENOUS at 21:39

## 2019-10-31 NOTE — PROGRESS NOTE ADULT - ASSESSMENT
34F hx of PCOS, obesity and hirsutism. Has tried medical therapy for her hirsutism without significant improvement. Outpatient work up found elevated testosterone levels. Urine metanephrines were WNL. Follow up imaging showing 6cm left adrenal mass now s/p elective robotic assisted laparoscopic left adrenalectomy    - cont CLD  - Pain/nausea control  - OOBA/IS/SCDs  - AM labs  - Resume SQH  - Dispo: Gregory d/c 11/1 pending progress

## 2019-10-31 NOTE — PROGRESS NOTE ADULT - SUBJECTIVE AND OBJECTIVE BOX
Post-Op Day #:    Procedure/Dx/Injuries:     Events of past 24 hours:    MEDICATIONS  (STANDING):  lactated ringers. 1000 milliLiter(s) (120 mL/Hr) IV Continuous <Continuous>  magnesium sulfate  IVPB 1 Gram(s) IV Intermittent once    MEDICATIONS  (PRN):  HYDROmorphone  Injectable 0.5 milliGRAM(s) IV Push every 1 hour PRN breakthrough  ondansetron Injectable 4 milliGRAM(s) IV Push every 6 hours PRN Nausea  oxycodone    5 mG/acetaminophen 325 mG 1 Tablet(s) Oral every 4 hours PRN Moderate Pain (4 - 6)  oxycodone    5 mG/acetaminophen 325 mG 2 Tablet(s) Oral every 4 hours PRN Severe Pain (7 - 10)        Vitals: T(F): 98.6 (10-31-19 @ 05:43), Max: 99.7 (10-31-19 @ 01:18)  HR: 88 (10-31-19 @ 05:43)  BP: 133/65 (10-31-19 @ 05:43)  RR: 17 (10-31-19 @ 05:43)  SpO2: 98% (10-31-19 @ 05:43)        Diet, Clear Liquid            PHYSICAL EXAM  General: NAD. Resting comfortably.  Pulmonary: Non-labored breathing. No respiratory distress.  Abdomen: Soft, non-tender, non-distended.  :   Extremities: WWP. No C/C/E          LAB/STUDIES:  CAPILLARY BLOOD GLUCOSE                              11.9   9.97  )-----------( 233      ( 30 Oct 2019 19:34 )             37.7     10-30    137  |  103  |  14  ----------------------------<  109<H>  4.5   |  22  |  0.96    Ca    9.0      30 Oct 2019 19:34  Phos  3.6     10-30  Mg     1.9     10-30                    IMAGING: Post-Op Day #: 1    Procedure/Dx/Injuries: Robotic assisted laparoscopic left adrenalectomy.    Events of past 24 hours: NAEON. Pain is controlled. Denies nausea, vomiting, shortness of breath.      MEDICATIONS  (STANDING):  lactated ringers. 1000 milliLiter(s) (120 mL/Hr) IV Continuous <Continuous>  magnesium sulfate  IVPB 1 Gram(s) IV Intermittent once    MEDICATIONS  (PRN):  HYDROmorphone  Injectable 0.5 milliGRAM(s) IV Push every 1 hour PRN breakthrough  ondansetron Injectable 4 milliGRAM(s) IV Push every 6 hours PRN Nausea  oxycodone    5 mG/acetaminophen 325 mG 1 Tablet(s) Oral every 4 hours PRN Moderate Pain (4 - 6)  oxycodone    5 mG/acetaminophen 325 mG 2 Tablet(s) Oral every 4 hours PRN Severe Pain (7 - 10)        Vitals: T(F): 98.6 (10-31-19 @ 05:43), Max: 99.7 (10-31-19 @ 01:18)  HR: 88 (10-31-19 @ 05:43)  BP: 133/65 (10-31-19 @ 05:43)  RR: 17 (10-31-19 @ 05:43)  SpO2: 98% (10-31-19 @ 05:43)        Diet, Clear Liquid          PHYSICAL EXAM  General: NAD. Resting comfortably.  Pulmonary: Non-labored breathing. No respiratory distress.   Abdomen: Soft, appropriately tender, minimally distended. Incisions c/d/i.  Extremities: WWP. No C/C/E          LAB/STUDIES:  CAPILLARY BLOOD GLUCOSE    10-31    138  |  104  |  11  ----------------------------<  98  4.2   |  24  |  1.06    Ca    9.0      31 Oct 2019 07:28  Phos  3.1     10-31  Mg     1.8     10-31                          11.7   10.19 )-----------( 231      ( 31 Oct 2019 07:28 )             37.5

## 2019-11-01 ENCOUNTER — TRANSCRIPTION ENCOUNTER (OUTPATIENT)
Age: 34
End: 2019-11-01

## 2019-11-01 LAB
ANION GAP SERPL CALC-SCNC: 7 MMOL/L — SIGNIFICANT CHANGE UP (ref 5–17)
BUN SERPL-MCNC: 6 MG/DL — LOW (ref 7–23)
CALCIUM SERPL-MCNC: 8.6 MG/DL — SIGNIFICANT CHANGE UP (ref 8.4–10.5)
CHLORIDE SERPL-SCNC: 105 MMOL/L — SIGNIFICANT CHANGE UP (ref 96–108)
CO2 SERPL-SCNC: 27 MMOL/L — SIGNIFICANT CHANGE UP (ref 22–31)
CREAT SERPL-MCNC: 0.9 MG/DL — SIGNIFICANT CHANGE UP (ref 0.5–1.3)
GLUCOSE SERPL-MCNC: 91 MG/DL — SIGNIFICANT CHANGE UP (ref 70–99)
HCT VFR BLD CALC: 36.1 % — SIGNIFICANT CHANGE UP (ref 34.5–45)
HGB BLD-MCNC: 11 G/DL — LOW (ref 11.5–15.5)
MAGNESIUM SERPL-MCNC: 1.8 MG/DL — SIGNIFICANT CHANGE UP (ref 1.6–2.6)
MCHC RBC-ENTMCNC: 28 PG — SIGNIFICANT CHANGE UP (ref 27–34)
MCHC RBC-ENTMCNC: 30.5 GM/DL — LOW (ref 32–36)
MCV RBC AUTO: 91.9 FL — SIGNIFICANT CHANGE UP (ref 80–100)
NRBC # BLD: 0 /100 WBCS — SIGNIFICANT CHANGE UP (ref 0–0)
PHOSPHATE SERPL-MCNC: 2.7 MG/DL — SIGNIFICANT CHANGE UP (ref 2.5–4.5)
PLATELET # BLD AUTO: 197 K/UL — SIGNIFICANT CHANGE UP (ref 150–400)
POTASSIUM SERPL-MCNC: 4 MMOL/L — SIGNIFICANT CHANGE UP (ref 3.5–5.3)
POTASSIUM SERPL-SCNC: 4 MMOL/L — SIGNIFICANT CHANGE UP (ref 3.5–5.3)
RBC # BLD: 3.93 M/UL — SIGNIFICANT CHANGE UP (ref 3.8–5.2)
RBC # FLD: 12.9 % — SIGNIFICANT CHANGE UP (ref 10.3–14.5)
SODIUM SERPL-SCNC: 139 MMOL/L — SIGNIFICANT CHANGE UP (ref 135–145)
WBC # BLD: 8.15 K/UL — SIGNIFICANT CHANGE UP (ref 3.8–10.5)
WBC # FLD AUTO: 8.15 K/UL — SIGNIFICANT CHANGE UP (ref 3.8–10.5)

## 2019-11-01 RX ORDER — MAGNESIUM SULFATE 500 MG/ML
1 VIAL (ML) INJECTION ONCE
Refills: 0 | Status: COMPLETED | OUTPATIENT
Start: 2019-11-01 | End: 2019-11-01

## 2019-11-01 RX ORDER — DOCUSATE SODIUM 100 MG
1 CAPSULE ORAL
Qty: 14 | Refills: 0
Start: 2019-11-01 | End: 2019-11-07

## 2019-11-01 RX ADMIN — OXYCODONE AND ACETAMINOPHEN 2 TABLET(S): 5; 325 TABLET ORAL at 22:30

## 2019-11-01 RX ADMIN — OXYCODONE AND ACETAMINOPHEN 2 TABLET(S): 5; 325 TABLET ORAL at 08:57

## 2019-11-01 RX ADMIN — Medication 62.5 MILLIMOLE(S): at 12:41

## 2019-11-01 RX ADMIN — SCOPALAMINE 1 PATCH: 1 PATCH, EXTENDED RELEASE TRANSDERMAL at 19:13

## 2019-11-01 RX ADMIN — Medication 100 GRAM(S): at 11:05

## 2019-11-01 RX ADMIN — OXYCODONE AND ACETAMINOPHEN 2 TABLET(S): 5; 325 TABLET ORAL at 17:48

## 2019-11-01 RX ADMIN — HEPARIN SODIUM 5000 UNIT(S): 5000 INJECTION INTRAVENOUS; SUBCUTANEOUS at 05:09

## 2019-11-01 RX ADMIN — OXYCODONE AND ACETAMINOPHEN 2 TABLET(S): 5; 325 TABLET ORAL at 02:00

## 2019-11-01 RX ADMIN — OXYCODONE AND ACETAMINOPHEN 2 TABLET(S): 5; 325 TABLET ORAL at 00:17

## 2019-11-01 RX ADMIN — OXYCODONE AND ACETAMINOPHEN 2 TABLET(S): 5; 325 TABLET ORAL at 21:53

## 2019-11-01 RX ADMIN — OXYCODONE AND ACETAMINOPHEN 2 TABLET(S): 5; 325 TABLET ORAL at 17:47

## 2019-11-01 RX ADMIN — HEPARIN SODIUM 5000 UNIT(S): 5000 INJECTION INTRAVENOUS; SUBCUTANEOUS at 21:53

## 2019-11-01 RX ADMIN — OXYCODONE AND ACETAMINOPHEN 2 TABLET(S): 5; 325 TABLET ORAL at 08:59

## 2019-11-01 RX ADMIN — HEPARIN SODIUM 5000 UNIT(S): 5000 INJECTION INTRAVENOUS; SUBCUTANEOUS at 14:18

## 2019-11-01 RX ADMIN — SCOPALAMINE 1 PATCH: 1 PATCH, EXTENDED RELEASE TRANSDERMAL at 05:55

## 2019-11-01 NOTE — PROGRESS NOTE ADULT - ASSESSMENT
34F hx of PCOS, obesity and hirsutism. Has tried medical therapy for her hirsutism without significant improvement. Outpatient work up found elevated testosterone levels. Urine metanephrines were WNL. Follow up imaging showing 6cm left adrenal mass now s/p elective robotic assisted laparoscopic left adrenalectomy    - cont CLD  - Pain/nausea control  - OOBA/IS  - AM labs  - SCDs/SQH  - Dispo: Poss d/c 11/1 pending progress 34F hx of PCOS, obesity and hirsutism. Has tried medical therapy for her hirsutism without significant improvement. Outpatient work up found elevated testosterone levels. Urine metanephrines were WNL. Follow up imaging showing 6cm left adrenal mass now s/p elective robotic assisted laparoscopic left adrenalectomy    - cont CLD  - Pain/nausea control  - OOBA/IS  - AM labs  - SCDs/SQH  - DC JOEL Gallardo  - Dispo: DC home today

## 2019-11-01 NOTE — PROGRESS NOTE ADULT - SUBJECTIVE AND OBJECTIVE BOX
Post-Op Day #: 2    Procedure/Dx/Injuries: Robotic assisted laparoscopic left adrenalectomy.    Events of past 24 hours:      MEDICATIONS  (STANDING):  heparin  Injectable 5000 Unit(s) SubCutaneous every 8 hours  lactated ringers. 1000 milliLiter(s) (50 mL/Hr) IV Continuous <Continuous>    MEDICATIONS  (PRN):  ondansetron Injectable 4 milliGRAM(s) IV Push every 6 hours PRN Nausea  oxycodone    5 mG/acetaminophen 325 mG 1 Tablet(s) Oral every 4 hours PRN Moderate Pain (4 - 6)  oxycodone    5 mG/acetaminophen 325 mG 2 Tablet(s) Oral every 4 hours PRN Severe Pain (7 - 10)        Vitals: T(F): 98.5 (11-01-19 @ 05:42), Max: 99.6 (10-31-19 @ 20:00)  HR: 92 (11-01-19 @ 05:42)  BP: 123/76 (11-01-19 @ 05:42)  RR: 17 (11-01-19 @ 05:42)  SpO2: 97% (11-01-19 @ 05:42)        Diet, Clear Liquid      10-30-19 @ 07:01  -  10-31-19 @ 07:00  --------------------------------------------------------  IN:    lactated ringers.: 1560 mL  Total IN: 1560 mL    OUT:    Indwelling Catheter - Urethral: 1070 mL  Total OUT: 1070 mL    Total NET: 490 mL            PHYSICAL EXAM  General: NAD. Resting comfortably.  Pulmonary: Non-labored breathing. No respiratory distress.  Abdomen: Soft, non-tender, non-distended.  :   Extremities: WWP. No C/C/E          LAB/STUDIES:                        11.7   10.19 )-----------( 231      ( 31 Oct 2019 07:28 )             37.5     10-31    138  |  104  |  11  ----------------------------<  98  4.2   |  24  |  1.06    Ca    9.0      31 Oct 2019 07:28  Phos  3.1     10-31  Mg     1.8     10-31                    IMAGING: Post-Op Day #: 2    Procedure/Dx/Injuries: Robotic assisted laparoscopic left adrenalectomy.    Events of past 24 hours: NAEON. Tolerating CLD. Pain is controlled. Denies nausea, vomiting. No flatus or BM yet.      MEDICATIONS  (STANDING):  heparin  Injectable 5000 Unit(s) SubCutaneous every 8 hours  lactated ringers. 1000 milliLiter(s) (50 mL/Hr) IV Continuous <Continuous>    MEDICATIONS  (PRN):  ondansetron Injectable 4 milliGRAM(s) IV Push every 6 hours PRN Nausea  oxycodone    5 mG/acetaminophen 325 mG 1 Tablet(s) Oral every 4 hours PRN Moderate Pain (4 - 6)  oxycodone    5 mG/acetaminophen 325 mG 2 Tablet(s) Oral every 4 hours PRN Severe Pain (7 - 10)        Vitals: T(F): 98.5 (11-01-19 @ 05:42), Max: 99.6 (10-31-19 @ 20:00)  HR: 92 (11-01-19 @ 05:42)  BP: 123/76 (11-01-19 @ 05:42)  RR: 17 (11-01-19 @ 05:42)  SpO2: 97% (11-01-19 @ 05:42)        Diet, Clear Liquid      10-30-19 @ 07:01  -  10-31-19 @ 07:00  --------------------------------------------------------  IN:    lactated ringers.: 1560 mL  Total IN: 1560 mL    OUT:    Indwelling Catheter - Urethral: 1070 mL  Total OUT: 1070 mL    Total NET: 490 mL            PHYSICAL EXAM  General: NAD. Resting comfortably.  Pulmonary: Non-labored breathing. No respiratory distress.   Abdomen: Soft, appropriately tender, minimally distended. Incisions c/d/i.  Extremities: WWP. No C/C/E              LAB/STUDIES:                       11-01    139  |  105  |  6<L>  ----------------------------<  91  4.0   |  27  |  0.90    Ca    8.6      01 Nov 2019 06:35  Phos  2.7     11-01  Mg     1.8     11-01                          11.0   8.15  )-----------( 197      ( 01 Nov 2019 06:35 )             36.1

## 2019-11-01 NOTE — DISCHARGE NOTE PROVIDER - HOSPITAL COURSE
Ms. Fish is a 34 year old female with PMH of PCOS, obesity, and hirsutism who was found on imaging to have 6cm left adrenal mass now s/p elective robotic assisted laparoscopic left adrenalectomy. Her post-operative course has been uncomplicated. She has been tolerating clear liquid diet, ambulating, and her pain has been controlled. She is stable and ready for discharge home with follow up with Dr. Israel in 1 week.

## 2019-11-01 NOTE — DISCHARGE NOTE PROVIDER - NSDCFUADDINST_GEN_ALL_CORE_FT
- Please follow up with Dr. Israel in 1 week. Call [phone] to schedule an appointment.    - Call provider or return to ER for fevers (temperature >101.4F), chills, persistent nausea/vomiting, worsening or severe pain, redness or drainage from abdominal wounds    - Wound care: You may shower but do not scrub wound or incisions. Keep incisions clean and dry. Bathing is not advised.    - You may resume any home medications as prescribed unless specifically instructed not to    - You may take 1 Percocet tab every 6 hours as needed for pain. Take with Colace, 2 tabs a day for constipation, as needed. Do not drive while taking narcotic medications.    - Please continue clear liquid diet until you begin to pass gas and have bowel movements. You can then resume regular diet as tolerated if you are not having nausea or vomiting.

## 2019-11-01 NOTE — DISCHARGE NOTE PROVIDER - CARE PROVIDER_API CALL
Dmitriy Israel)  Surgery  [address], [address]  [place], [place] [address]  Phone: [phone]  Fax: [phone]  Follow Up Time:

## 2019-11-01 NOTE — DISCHARGE NOTE PROVIDER - NSDCMRMEDTOKEN_GEN_ALL_CORE_FT
Colace 100 mg oral capsule: 1 cap(s) orally 2 times a day, As Needed -for constipation   oxycodone-acetaminophen 5 mg-325 mg oral tablet: 1 tab(s) orally every 6 hours, As Needed -for severe pain MDD:4 tablets

## 2019-11-02 ENCOUNTER — TRANSCRIPTION ENCOUNTER (OUTPATIENT)
Age: 34
End: 2019-11-02

## 2019-11-02 VITALS
OXYGEN SATURATION: 97 % | TEMPERATURE: 99 F | HEART RATE: 83 BPM | SYSTOLIC BLOOD PRESSURE: 117 MMHG | DIASTOLIC BLOOD PRESSURE: 78 MMHG | RESPIRATION RATE: 17 BRPM

## 2019-11-02 RX ORDER — DOCUSATE SODIUM 100 MG
1 CAPSULE ORAL
Qty: 14 | Refills: 0
Start: 2019-11-02 | End: 2019-11-08

## 2019-11-02 RX ADMIN — HEPARIN SODIUM 5000 UNIT(S): 5000 INJECTION INTRAVENOUS; SUBCUTANEOUS at 05:04

## 2019-11-02 RX ADMIN — SCOPALAMINE 1 PATCH: 1 PATCH, EXTENDED RELEASE TRANSDERMAL at 06:22

## 2019-11-02 NOTE — DISCHARGE NOTE NURSING/CASE MANAGEMENT/SOCIAL WORK - NSDCPNINST_GEN_ALL_CORE
Call Dr. Israel if you have any questions or concerns. Educational material given- [facility]. Online Patient Education: Surgical Wound Discharge Instructions.

## 2019-11-02 NOTE — PROGRESS NOTE ADULT - SUBJECTIVE AND OBJECTIVE BOX
INTERVAL HPI/OVERNIGHT EVENTS: ROCÍO    STATUS POST:  left adrenalectomy    POST OPERATIVE DAY #: 3    SUBJECTIVE:  pt seen at bedside, complains of some incisional pain, well controlled. denies nausea and vomiting.    MEDICATIONS  (STANDING):  heparin  Injectable 5000 Unit(s) SubCutaneous every 8 hours    MEDICATIONS  (PRN):  ondansetron Injectable 4 milliGRAM(s) IV Push every 6 hours PRN Nausea  oxycodone    5 mG/acetaminophen 325 mG 1 Tablet(s) Oral every 4 hours PRN Moderate Pain (4 - 6)  oxycodone    5 mG/acetaminophen 325 mG 2 Tablet(s) Oral every 4 hours PRN Severe Pain (7 - 10)      Vital Signs Last 24 Hrs  T(C): 36.9 (02 Nov 2019 05:41), Max: 37.1 (01 Nov 2019 13:49)  T(F): 98.5 (02 Nov 2019 05:41), Max: 98.8 (01 Nov 2019 13:49)  HR: 76 (02 Nov 2019 05:41) (71 - 80)  BP: 101/65 (02 Nov 2019 05:41) (101/65 - 119/74)  BP(mean): --  RR: 17 (02 Nov 2019 05:41) (16 - 17)  SpO2: 95% (02 Nov 2019 05:41) (95% - 98%)    PHYSICAL EXAM:      Constitutional: A&Ox3    Respiratory: non labored breathing, no respiratory distress    Cardiovascular: NSR, RRR    Gastrointestinal: soft, nondistended, appropriate incisional tenderness at suprapubic incision    Extremities: (-) edema                  I&O's Detail    01 Nov 2019 07:01  -  02 Nov 2019 07:00  --------------------------------------------------------  IN:    Oral Fluid: 500 mL  Total IN: 500 mL    OUT:    Voided: 3100 mL  Total OUT: 3100 mL    Total NET: -2600 mL          LABS:                        11.0   8.15  )-----------( 197      ( 01 Nov 2019 06:35 )             36.1     11-01    139  |  105  |  6<L>  ----------------------------<  91  4.0   |  27  |  0.90    Ca    8.6      01 Nov 2019 06:35  Phos  2.7     11-01  Mg     1.8     11-01            RADIOLOGY & ADDITIONAL STUDIES:

## 2019-11-02 NOTE — PROGRESS NOTE ADULT - ASSESSMENT
34F hx of PCOS, obesity and hirsutism. Has tried medical therapy for her hirsutism without significant improvement. Outpatient work up found elevated testosterone levels. Urine metanephrines were WNL. Follow up imaging showing 6cm left adrenal mass now s/p elective robotic assisted laparoscopic left adrenalectomy    CLD  Pain/nausea control  OOBA/IS  SQH/SCDs  AM labs  dc home today

## 2019-11-02 NOTE — DISCHARGE NOTE NURSING/CASE MANAGEMENT/SOCIAL WORK - PATIENT PORTAL LINK FT
You can access the FollowMyHealth Patient Portal offered by [facility] by registering at the following website: [email or web address]. By joining [facility]’s FollowMyHealth portal, you will also be able to view your health information using other applications (apps) compatible with our system.

## 2019-11-04 PROBLEM — E27.8 OTHER SPECIFIED DISORDERS OF ADRENAL GLAND: Chronic | Status: ACTIVE | Noted: 2019-10-29

## 2019-11-07 PROCEDURE — 86901 BLOOD TYPING SEROLOGIC RH(D): CPT

## 2019-11-07 PROCEDURE — 85027 COMPLETE CBC AUTOMATED: CPT

## 2019-11-07 PROCEDURE — 88307 TISSUE EXAM BY PATHOLOGIST: CPT

## 2019-11-07 PROCEDURE — 86900 BLOOD TYPING SEROLOGIC ABO: CPT

## 2019-11-07 PROCEDURE — 36415 COLL VENOUS BLD VENIPUNCTURE: CPT

## 2019-11-07 PROCEDURE — S2900: CPT

## 2019-11-07 PROCEDURE — 83735 ASSAY OF MAGNESIUM: CPT

## 2019-11-07 PROCEDURE — 86850 RBC ANTIBODY SCREEN: CPT

## 2019-11-07 PROCEDURE — 80048 BASIC METABOLIC PNL TOTAL CA: CPT

## 2019-11-07 PROCEDURE — 84100 ASSAY OF PHOSPHORUS: CPT

## 2019-11-08 DIAGNOSIS — R10.9 UNSPECIFIED ABDOMINAL PAIN: ICD-10-CM

## 2019-11-08 DIAGNOSIS — E66.9 OBESITY, UNSPECIFIED: ICD-10-CM

## 2019-11-08 DIAGNOSIS — L68.0 HIRSUTISM: ICD-10-CM

## 2019-11-08 DIAGNOSIS — E28.2 POLYCYSTIC OVARIAN SYNDROME: ICD-10-CM

## 2019-11-08 DIAGNOSIS — E27.9 DISORDER OF ADRENAL GLAND, UNSPECIFIED: ICD-10-CM

## 2019-11-12 ENCOUNTER — APPOINTMENT (OUTPATIENT)
Dept: SURGICAL ONCOLOGY | Facility: CLINIC | Age: 34
End: 2019-11-12
Payer: COMMERCIAL

## 2019-11-12 VITALS
HEIGHT: 64 IN | SYSTOLIC BLOOD PRESSURE: 120 MMHG | TEMPERATURE: 98.6 F | HEART RATE: 94 BPM | DIASTOLIC BLOOD PRESSURE: 75 MMHG | BODY MASS INDEX: 32.27 KG/M2 | WEIGHT: 189 LBS

## 2019-11-12 DIAGNOSIS — E27.8 OTHER SPECIFIED DISORDERS OF ADRENAL GLAND: ICD-10-CM

## 2019-11-12 PROCEDURE — 99024 POSTOP FOLLOW-UP VISIT: CPT

## 2019-11-12 NOTE — PHYSICAL EXAM
[Normal] : well developed, well nourished, in no acute distress [de-identified] : soft, non tender, surgical sites are clean and dry. no signs of infection

## 2019-11-12 NOTE — HISTORY OF PRESENT ILLNESS
[FreeTextEntry1] : Patient Name: MICHELLE SOSA \par MRN: [id number] \par [facility] MRN:[id number] \par Referring Provider: Dr. Matthias England\par Date: Nov 06, 2019 \par \par Diagnosis: adrenal mass\par Operative Date: 10/30/19\par Procedure: adrenalectomy\par \par  She is 13 days post op. Surgery uneventful. Large lesion identified and completely resected. Discharged after 3 days. She feels well.\par \par Curently, Ms. SOSA denies abdominal pain and discomfort. She denies fevers, chills, or night sweats. She is tolerating a regular diet and is having regular bowel movements. Pain is well controlled.\par \par Final Pathology Showed: Adrenocortical oncocytic adenoma\par \par \par

## 2019-11-12 NOTE — ASSESSMENT
[FreeTextEntry1] : I) normal postop\par \par P) Discusssed this is an unusual benign lesion. No indications of malignant features. Will send hormonal profile today to see if levels are normalizing. Will RTC 3 months and see Dr England in 3-4 weeks.\par \par Dmitriy Israel MD\par \par Chief Surgical Oncology\par Multidisciplinary GI cancer program\par [facility]\par [facility]\par \par Professor Surgery\par [employer]\par \par cc Dr England

## 2019-11-12 NOTE — DATA REVIEWED
[FreeTextEntry1] : Diagnostic Studies: No new results to review\par \par Labs: No new results to review\par \par Pathology: *see advanced result citation for complete report\par \par \par

## 2019-11-20 LAB
ACTH-ESO: 22 PG/ML
ALBUMIN SERPL ELPH-MCNC: 4.4 G/DL
ALP BLD-CCNC: 58 U/L
ALT SERPL-CCNC: 15 U/L
ANDROST SERPL-MCNC: 100 NG/DL
ANION GAP SERPL CALC-SCNC: 13 MMOL/L
AST SERPL-CCNC: 13 U/L
BILIRUB SERPL-MCNC: 0.2 MG/DL
BUN SERPL-MCNC: 16 MG/DL
CALCIUM SERPL-MCNC: 10 MG/DL
CHLORIDE SERPL-SCNC: 101 MMOL/L
CO2 SERPL-SCNC: 24 MMOL/L
CORTICOSTEROID BIND GLOBULIN: 2.5 MG/DL
CORTIS SERPL-MCNC: 7.5 UG/DL
CORTISOL, FREE: 0.26 UG/DL
CREAT SERPL-MCNC: 0.99 MG/DL
DHEA-SULFATE, SERUM: 95 UG/DL
GLUCOSE SERPL-MCNC: 88 MG/DL
METANEPHRINE, PL: <10 PG/ML
NORMETANEPHRINE, PL: 57 PG/ML
PFCX: 3.5 %
POTASSIUM SERPL-SCNC: 4.1 MMOL/L
PROT SERPL-MCNC: 7.5 G/DL
SODIUM SERPL-SCNC: 138 MMOL/L
TESTOST BND SERPL-MCNC: 0.6 PG/ML
TESTOST SERPL-MCNC: 11.4 NG/DL

## 2019-11-22 ENCOUNTER — APPOINTMENT (OUTPATIENT)
Dept: SURGICAL ONCOLOGY | Facility: CLINIC | Age: 34
End: 2019-11-22

## 2019-12-02 ENCOUNTER — MOBILE ON CALL (OUTPATIENT)
Age: 34
End: 2019-12-02

## 2020-01-02 ENCOUNTER — APPOINTMENT (OUTPATIENT)
Dept: ENDOCRINOLOGY | Facility: CLINIC | Age: 35
End: 2020-01-02
Payer: COMMERCIAL

## 2020-01-02 VITALS
HEART RATE: 79 BPM | DIASTOLIC BLOOD PRESSURE: 70 MMHG | SYSTOLIC BLOOD PRESSURE: 111 MMHG | BODY MASS INDEX: 33.13 KG/M2 | WEIGHT: 193 LBS

## 2020-01-02 DIAGNOSIS — E28.1 ANDROGEN EXCESS: ICD-10-CM

## 2020-01-02 DIAGNOSIS — D35.00 BENIGN NEOPLASM OF UNSPECIFIED ADRENAL GLAND: ICD-10-CM

## 2020-01-02 PROCEDURE — 99215 OFFICE O/P EST HI 40 MIN: CPT

## 2020-01-03 PROBLEM — E28.1 HYPERANDROGENEMIA: Status: ACTIVE | Noted: 2019-05-13

## 2020-01-03 PROBLEM — D35.00 ADRENAL BENIGN TUMOR: Status: ACTIVE | Noted: 2020-01-03

## 2020-01-03 NOTE — ADDENDUM
[FreeTextEntry1] : I, Froylan Carter, acted solely as a scribe for Dr. Matthias England on this date. 01/02/2020.

## 2020-01-03 NOTE — HISTORY OF PRESENT ILLNESS
[Ibandronate (Boniva)] : Ibandronate [FreeTextEntry1] : 3/21/19 Total testosterone 128 (normal 8.4-48) Free Testosterone 18.83 (normal 0.3-5.6)\par 5/13/19 A1c 5.2%, LDL-c 78, s. creat 1.00, DHEAS 586, LH 10.5, Prolactin 13.4, FSH 4.2, TSH 1.15, Free Testosterone 4.7, Total Testosterone 72.0, Androstenedione 658 \par 7/15/19: DHEA- Sulfate 760, androstenedione 709, testosterone free 8.9, testosterone total 120.3, estradiol 37, Prolactin 14.4\par 8/7/19 Abdomen and Pelvis CT impression: 4.8 cm enhancing lesion seen in the left adrenal gland with necrotic center. Given history of hyperandrogenism, size and heterogeneity adrenal cortical carcinoma must be excluded. Surgical consultation is recommended. No vascular invasion or metastatic disease\par 9/18/19: metanephrine 449, total metanephrines 1329, aldosterone 9.2, TSH 1.61, creatinine 24 hr 90 mg/dL, U creat calculation 2.2g/24hr, cortisol 24 hr urine volume 2425\par 9/19/19 PET tumor imaging: Hypermetabolic 6.4 cm centrally necrotic left adrenal mass (SUV 24.0), corresponding to abnormality on \par prior CT, consistent with primary neoplasm such as adrenocortical carcinoma. No foci of abnormal FDG uptake are noted in the liver correlating to CT abnormality. No evidence of metastatic disease.\par 10/4/19: Metanephrine 172, metanephrine total 535,normetanephrine 363, U creat 1.49\par 11/12/19: DHEAS 95, normetanephrine 57, Metanephrine <10, testosterone 0.6, testosterone total 11.4, androstenedione 100 ACTH 22, \par \par 34 y/o F pt, BMI , with elevated levels of testosterone and Hx of PCOS.\par Other PM Hx: obesity. L adrenalectomy 10/31/19\par No Hx of HTN, Hyperlipidemia \par FHx: HTN (mother)\par No FHx of PCOS or CHH\par SHx: no tobacco use, social EtOH\par LNMP: 01/02/2020; pt has regular menses and is not sexually active (pt had irregular menses prior to oral contraceptive use in 2018, later DC/ed in 3/2019 because it did not change pt's testosterone levels) \par \par 5/13/19 visit:\par Today pt presents, feeling well. She notes she had hormonal imbalance with symptoms of hair growth at age 26. Pt saw dermatologist initially and then OB/GYN, who recommended pt take spirolactone. She states spironolactone helped with her bloating, however did not notice much change in hair growth. Pt started birth control in 2018, DC/ed 3/2019 because it did not change pt's testosterone level. Pt notes shaving and waxing to get rid of the hair; she hair growth on her stomach and back, but not her inner thighs. Pt also c/o polyuria and nocturia which she relates to drinking a lot of water.\par Denies blurry vision, galactorrhea, and HA.\par \par 6/7/19 visit:\par Today pt presents for endocrine f/u. She has been experiencing symptoms of hirsutism and acne for the past 5 yrs; pt notes fluctuating weight change over the past years. She states has been on and off spironolactone for 5 yrs, and is considering changing her dermatologist. \par Denies abd pain, and galactorrhea. \par \par 08/21/2019 \par Pt with a Hx of Hirsutism, and increased levels of testosterone in 5/2019,  including DHEA- Sulfate.\par Lab was repeated in July and confirms the above labs results.\par Pt presents today with her sister for a testosterone f/u. Patient is feeling fine. Clinically unchanged\par \par 01/02/2020 \par Pt presents today with a hx of hirsutism s/p Left adrenalectomy 10/31/19 with a pathology report as oncocytic adrenal neoplasm OAN. She is feeling well with no physical complaints. She reports her menses continues to be normal. Pt notes some hair thinning, denies skin changes, ab discomfort, palpitation, headaches, and excessive urination. Pt reports starting a laser procedure 2 weeks ago and notes no facial hair growth since then.\par \par Current Meds: None

## 2020-01-03 NOTE — REVIEW OF SYSTEMS
[As Noted in HPI] : as noted in HPI [Negative] : Psychiatric [Recent Weight Gain (___ Lbs)] : recent [unfilled] ~Ulb weight gain [Hair Loss] : hair loss [Abdominal Pain] : no abdominal pain [Polyuria] : no polyuria [Palpitations] : no palpitations [Headache] : no headaches [de-identified] : denies skin changes

## 2020-01-03 NOTE — ASSESSMENT
[Importance of Diet and Exercise] : importance of diet and exercise to improve glycemic control, achieve weight loss and improve cardiovascular health [FreeTextEntry1] : 35 y/o F pt with:\par \par 1. 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 Considering PET scan.\par f/u 6 months, will contact pt within the next few days

## 2020-01-03 NOTE — PHYSICAL EXAM
[Alert] : alert [Normal Sclera/Conjunctiva] : normal sclera/conjunctiva [No Respiratory Distress] : no respiratory distress [Normal Outer Ear/Nose] : the ears and nose were normal in appearance [No Accessory Muscle Use] : no accessory muscle use [Clear to Auscultation] : lungs were clear to auscultation bilaterally [Normal Rate] : heart rate was normal  [Normal Bowel Sounds] : normal bowel sounds [No Edema] : there was no peripheral edema [Pedal Pulses Normal] : the pedal pulses are present [Normal Gait] : normal gait [Spine Straight] : spine straight [No Stigmata of Cushings Syndrome] : no stigmata of cushings syndrome [Oriented x3] : oriented to person, place, and time [Normal Reflexes] : deep tendon reflexes were 2+ and symmetric [Acanthosis Nigricans] : no acanthosis nigricans [de-identified] : no buffalo hump  [de-identified] : sinus normal

## 2020-01-03 NOTE — END OF VISIT
[FreeTextEntry3] : All medical record entries made by the scribe were at my, Dr. Matthias England, direction and personally dictated by me on 01/02/2020 I have reviewed the chart and agree that the record accurately reflects my personal performance of the history, physical exam, assessment and plan. I have also personally directed, reviewed and agreed with the chart.  [Time Spent: ___ minutes] : I have spent [unfilled] minutes of face to face time with the patient [>50% of Time Spent on Counseling for ____] : Greater than 50% of the encounter time was spent on counseling for [unfilled]

## 2021-10-27 NOTE — REASON FOR VISIT
[Follow-Up: _____] : a [unfilled] follow-up visit I have reviewed and confirmed nurses' notes for patient's medications, allergies, medical history, and surgical history.

## 2021-12-01 NOTE — DISCHARGE NOTE NURSING/CASE MANAGEMENT/SOCIAL WORK - HAS THE PATIENT RECEIVED THE INFLUENZA VACCINE THIS SEASON?
no...
Implemented All Universal Safety Interventions:  [place] to call system. Call bell, personal items and telephone within reach. Instruct patient to call for assistance. Room bathroom lighting operational. Non-slip footwear when patient is off stretcher. Physically safe environment: no spills, clutter or unnecessary equipment. Stretcher in lowest position, wheels locked, appropriate side rails in place.

## 2024-12-03 NOTE — END OF VISIT
[FreeTextEntry3] : All medical record entries made by the Scribe were at my, Dr. Matthias England, direction and personally dictated by me on 05/13/2019. I have reviewed the chart and agree that the record accurately reflects my personal performance of the history, physical exam, assessment and plan. I have also personally directed, reviewed and agreed with the chart.\par  [>50% of Time Spent on Counseling for ____] : Greater than 50% of the encounter time was spent on counseling for [unfilled] [Time Spent: ___ minutes] : I have spent [unfilled] minutes of face to face time with the patient What Type Of Note Output Would You Prefer (Optional)?: Standard Output Hpi Title: Evaluation of Skin Lesions Have Your Spot(S) Been Treated In The Past?: has not been treated

## 2024-12-05 ENCOUNTER — APPOINTMENT (OUTPATIENT)
Dept: ENDOCRINOLOGY | Facility: CLINIC | Age: 39
End: 2024-12-05
Payer: COMMERCIAL

## 2024-12-05 VITALS
HEART RATE: 88 BPM | BODY MASS INDEX: 36.9 KG/M2 | DIASTOLIC BLOOD PRESSURE: 72 MMHG | WEIGHT: 215 LBS | SYSTOLIC BLOOD PRESSURE: 123 MMHG

## 2024-12-05 DIAGNOSIS — E66.812 OBESITY, CLASS 2: ICD-10-CM

## 2024-12-05 DIAGNOSIS — L68.0 HIRSUTISM: ICD-10-CM

## 2024-12-05 PROCEDURE — 99205 OFFICE O/P NEW HI 60 MIN: CPT

## 2024-12-05 PROCEDURE — G2211 COMPLEX E/M VISIT ADD ON: CPT | Mod: NC

## 2024-12-09 PROBLEM — L68.0 HIRSUTISM: Status: ACTIVE | Noted: 2024-12-05
